# Patient Record
Sex: FEMALE | Race: WHITE
[De-identification: names, ages, dates, MRNs, and addresses within clinical notes are randomized per-mention and may not be internally consistent; named-entity substitution may affect disease eponyms.]

---

## 2017-04-28 ENCOUNTER — HOSPITAL ENCOUNTER (OUTPATIENT)
Dept: HOSPITAL 53 - M LAB REF | Age: 82
End: 2017-04-28
Attending: NURSE PRACTITIONER
Payer: MEDICARE

## 2017-04-28 DIAGNOSIS — D50.9: Primary | ICD-10-CM

## 2017-05-01 LAB
FERRITIN SERPL-MCNC: 389 NG/ML (ref 8–252)
FOLATE SERPL-MCNC: > 24 NG/ML
IRON SATN MFR SERPL: 46.2 % (ref 13.2–37.4)
TIBC SERPL-MCNC: 275 UG/DL (ref 250–450)
VIT B12 SERPL-MCNC: 1119 PG/ML

## 2017-08-04 ENCOUNTER — HOSPITAL ENCOUNTER (OUTPATIENT)
Dept: HOSPITAL 53 - M LRY | Age: 82
End: 2017-08-04
Attending: NURSE PRACTITIONER
Payer: MEDICARE

## 2017-08-04 DIAGNOSIS — M17.11: Primary | ICD-10-CM

## 2017-08-04 PROCEDURE — 73564 X-RAY EXAM KNEE 4 OR MORE: CPT

## 2017-08-04 NOTE — REP
RIGHT KNEE SERIES, COMPLETE:  08/04/2017:

 

Clinical history: Anterior right knee pain. The patient had difficulty

maintaining positioning for some of these images.

 

Findings:  No prior studies.  Bones are demineralized.  No subluxation of the

patella on the sunrise view.  I cannot see a definite suprapatellar effusion.

There are marginal osteophytes.  The medial and lateral joint margins and

patellofemoral articulation, representing some mild tricompartment

osteoarthritis.  No definite narrowing of the medial lateral compartments.  There

is no visible or displaced fracture, avulsion, loose body or osteochondral

defect.

 

Impression:

 

1.  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

avulsion, subluxation nor other acute finding.  No definite suprapatellar

effusion.

 

 

Signed by

Nitesh Teran MD 08/04/2017 10:27 P

## 2017-09-03 ENCOUNTER — HOSPITAL ENCOUNTER (EMERGENCY)
Dept: HOSPITAL 53 - M ED | Age: 82
Discharge: HOME | End: 2017-09-03
Payer: MEDICARE

## 2017-09-03 VITALS — SYSTOLIC BLOOD PRESSURE: 117 MMHG | DIASTOLIC BLOOD PRESSURE: 59 MMHG

## 2017-09-03 DIAGNOSIS — S00.91XA: Primary | ICD-10-CM
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
## 2017-09-03 DIAGNOSIS — Z79.899: ICD-10-CM
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
## 2017-09-03 DIAGNOSIS — I10: ICD-10-CM

## 2017-09-03 DIAGNOSIS — S70.02XA: ICD-10-CM
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
## 2017-09-03 NOTE — REP
Clinical:  Trauma.  Fall.

 

Technique:  AP and lateral views of the left femur.

 

Findings:

In conjunction with the left hip series, there is no evidence for acute fracture

or dislocation.  The patient is status post left hip replacement.  Age-related

changes noted.

 

Impression:

No acute fracture or dislocation.

 

 

Signed by

Alexys Weiss MD 09/03/2017 11:20 A

## 2017-09-03 NOTE — REP
Clinical:  Trauma.  Fall.  Head injury.

 

Findings:

Age-related atrophy and microvascular ischemic changes are appreciated.  The

ventricles and sulci are symmetric.  Gray-white differentiation is maintained.

There is no evidence for acute intracranial hemorrhage, mass/mass effect,

pathology or infarction.  No extra-axial fluid collection.  Calvarium is intact.

Paranasal sinuses and mastoid air cells are clear.

 

Small scalp hematoma/contusion overlies the left frontal bone.

 

Impression:

Small scalp contusion/hematoma overlies the left frontal bone.

Age related atrophy and microvascular ischemic changes.

No acute intracranial hemorrhage, infarction, or mass/mass effect.

 

 

Signed by

Alexys Weiss MD 09/03/2017 11:09 A

## 2017-09-03 NOTE — REP
Clinical:  Trauma.  Fall.

 

Technique:  Single AP view of the pelvis.

 

Findings:

The patient is status post left hip replacement.  Advanced degenerative changes

of the right hip.  No acute fracture dislocation identified.

 

Impression:

No acute fracture dislocation.

 

 

Signed by

Alexys Weiss MD 09/03/2017 11:21 A

## 2017-09-03 NOTE — REP
Clinical:  Trauma.  Fall.

 

Technique:  AP and frog lateral views of the left hip.

 

Findings:

The patient is status post replacement.  Age-related changes are appreciated.  No

acute fracture or dislocation.

 

Impression:

Evidence of prior left hip replacement.

No acute fracture or dislocation.

 

 

Signed by

Alexys Weiss MD 09/03/2017 11:19 A

## 2017-12-18 ENCOUNTER — HOSPITAL ENCOUNTER (OUTPATIENT)
Dept: HOSPITAL 53 - M WHC | Age: 82
End: 2017-12-18
Attending: INTERNAL MEDICINE
Payer: MEDICARE

## 2017-12-18 DIAGNOSIS — Z13.820: ICD-10-CM

## 2017-12-18 DIAGNOSIS — M81.0: ICD-10-CM

## 2017-12-18 DIAGNOSIS — Z12.31: Primary | ICD-10-CM

## 2017-12-18 PROCEDURE — 77080 DXA BONE DENSITY AXIAL: CPT

## 2017-12-18 NOTE — REPMRS
Patient History

The patient states she had a clinical breast exam in 10/17

Patient is postmenopausal and is nulliparous.

 

Digital Woman Screen Mammo: December 18, 2017 - Exam #: 

[id number]

Bilateral CC and MLO view(s) were taken.

 

Technologist: Pat Morgan, Technologist

Prior study comparison: November 2, 2016, digital woman screen 

mammo performed at [facility].  October 28, 2015, 

digital woman screen mammo performed at [facility].

 

FINDINGS: There are scattered fibroglandular densities.  There 

has been no change in the appearance of the mammogram from the 

prior studies.  There is a mild amount of residual fibroglandular

tissue which is fairly symmetric. There is no interval 

development of dominant mass, architectural distortion, or 

clustered microcalcification suggestive of malignancy.

 

ASSESSMENT: BI-RADS/ACR category 1 mammogram. Negative.

 

Recommendation

Routine screening mammogram in 1 year (for women over age 40).

This mammogram was interpreted with the aid of an FDA-approved 

computer-aided dectection system.

 

Electronically Signed By: Sravan French MD 12/18/17 [id number]

## 2017-12-19 NOTE — DEXA
AP SPINE   L1 - L4   1.019   -1.4      0.4

LT FEMUR   TOTAL   Left hip replacement         

RT FEMUR   TOTAL   0.896   -0.9      1.2

TOTAL BODY   TOTAL

OTHER



COMMENTS:

Normal bone densitometry of the right hip.

There is low bone density of the spine.

The decreased density of the spine does represent a significant change.

The increased density of the right hip does represent a significant change.

The density of the spine has increased 4.5% since the initial exam on 05/16/
2002.

The spine density has decreased 5.4% since the most recent exam on 10/28/2015.

The density of the right hip has increased 6.3% since the initial exam on 05/16/
2002.

The density of the right hip has increased 3.5% since the most recent exam on 10
/28/2015.



FOLLOW-UP:

Recommendation for the next bone density exam: 2 years.
[facility]

## 2018-12-19 ENCOUNTER — HOSPITAL ENCOUNTER (OUTPATIENT)
Dept: HOSPITAL 53 - M WHC | Age: 83
End: 2018-12-19
Attending: NURSE PRACTITIONER
Payer: MEDICARE

## 2018-12-19 DIAGNOSIS — Z78.0: ICD-10-CM

## 2018-12-19 DIAGNOSIS — Z12.31: Primary | ICD-10-CM

## 2018-12-19 NOTE — REPMRS
Patient History

The patient states she had a clinical breast exam in 11/18

Patient is postmenopausal, has history of other cancer at age 83,

and is nulliparous.

 

Digital Woman Screen Mammo: December 19, 2018 - Exam #: 

[id number]

Bilateral CC and MLO view(s) were taken.

 

Technologist: Pat Morgan, Technologist

Prior study comparison: December 18, 2017, digital woman screen 

mammo performed at [facility].  November 2, 2016, 

digital woman screen mammo performed at [facility].

October 28, 2015, digital woman screen mammo performed at 

[facility].

 

FINDINGS: There are scattered fibroglandular densities.  There 

has been no change in the appearance of the mammogram from the 

prior studies.  There is a mild amount of scattered 

fibroglandular density which is fairly symmetric. There is no 

interval development of dominant mass, architectural distortion, 

or clustered microcalcification suggestive of malignancy.

3-D tomosynthesis shows no additional findings.

 

Assessment: BI-RADS/ACR category 1 mammogram. Negative.

 

Recommendation

Routine screening mammogram of both breasts in 1 year (for women 

over age 40).

 

This patient's Lifetime Breast Cancer RIsk is estimated at 1.3 %.

This mammogram was interpreted with the aid of an FDA-approved 

computer-aided dectection system.

 

Electronically Signed By: Fausto Antonio MD 12/19/18 1002

## 2020-01-28 ENCOUNTER — HOSPITAL ENCOUNTER (OUTPATIENT)
Dept: HOSPITAL 53 - M WHC | Age: 85
End: 2020-01-28
Attending: NURSE PRACTITIONER
Payer: MEDICARE

## 2020-01-28 DIAGNOSIS — M81.0: ICD-10-CM

## 2020-01-28 DIAGNOSIS — Z12.31: Primary | ICD-10-CM

## 2020-01-28 NOTE — REP
BILATERAL SCREENING DIGITAL MAMMOGRAM WITH 3D TOMOSYNTHESIS:

 

There are no palpable abnormalities or other breast complaints.

The the patient states she had a clinical breast examination December, 2019.

The the patient states she performs self-breast examinations 12 times per year.

The Tyrer-Cuzick Score is: 0.9% .

 

Comparison is 11/10/2014.

 

There are scattered areas of fibroglandular density.

There is no dominant mass, micro calcific cluster or architectural distortion

that would indicate malignancy.

There are no additional findings on 3D tomosynthesiss.

There is no change from the prior study.

 

Impression:

BIRADS/ACR category 1 mammogram.  Negative.

 

Recommendation:

Routine annual screening mammography.

 

This mammogram was interpreted with the aid of a FDA approved computer-aided

detection system.

 

A. Negative mammogram reports should not delay biopsy if a dominant or clinically

suspicious mass is present.

B. Not all breast cancers are identified by mammography or tomosynthesis.

C.  Adenosis and dense breasts may obscure an underlying neoplasm.

 

Patient letter M1.

 

 

Electronically Signed by

Sravan Mathew MD 01/28/2020 04:22 P

## 2021-01-08 ENCOUNTER — HOSPITAL ENCOUNTER (OUTPATIENT)
Dept: HOSPITAL 53 - M WHC | Age: 86
End: 2021-01-08
Attending: NURSE PRACTITIONER
Payer: MEDICARE

## 2021-01-08 DIAGNOSIS — Z12.31: Primary | ICD-10-CM

## 2021-01-08 NOTE — REPMRS
Patient History

The patient states she had a clinical breast exam in 12/2020

No Hormone Replacement Therapy

 

3D TOMOSYNTHESIS WAS PERFORMED.

 

The [facility] lifetime risk for breast cancer is 0.4%.

 

Volpara breast density c.

 

Digital Woman Screen Mammo: January 8, 2021 - Exam #: 

[id number]

Bilateral CC and MLO view(s) were taken.

 

Technologist: Pat Morgan, Technologist

Prior study comparison: January 28, 2020, bilateral digital woman

screen mammo performed at [facility] 

[facility].  December 19, 2018, bilateral digital woman screen 

mammo performed at [facility].

 

FINDINGS: There are scattered fibroglandular densities.  There 

has been no change in the appearance of the mammogram from the 

prior studies.  There is a mild amount of residual fibroglandular

tissue which is fairly symmetric. There is no interval 

development of dominant mass, architectural distortion, or 

clustered microcalcification suggestive of malignancy.

 

Assessment: BI-RADS/ACR category 1 mammogram. Negative Mammogram.

 

Recommendation

Routine screening mammogram in 1 year (for women over age 40).

This mammogram was interpreted with the aid of an FDA-approved 

computer-aided dectection system.

 

Electronically Signed By: Sravan French MD 01/08/21 1966

## 2022-02-09 ENCOUNTER — HOSPITAL ENCOUNTER (OUTPATIENT)
Dept: HOSPITAL 53 - M WHC | Age: 87
End: 2022-02-09
Attending: INTERNAL MEDICINE
Payer: MEDICARE

## 2022-02-09 DIAGNOSIS — Z12.31: Primary | ICD-10-CM

## 2022-05-13 ENCOUNTER — HOSPITAL ENCOUNTER (OUTPATIENT)
Dept: HOSPITAL 53 - M RAD | Age: 87
End: 2022-05-13
Attending: INTERNAL MEDICINE
Payer: MEDICARE

## 2022-05-13 DIAGNOSIS — R16.1: Primary | ICD-10-CM

## 2023-04-19 ENCOUNTER — HOSPITAL ENCOUNTER (OUTPATIENT)
Dept: HOSPITAL 53 - M RAD | Age: 88
End: 2023-04-19
Attending: INTERNAL MEDICINE
Payer: MEDICARE

## 2023-04-19 DIAGNOSIS — R10.811: ICD-10-CM

## 2023-04-19 DIAGNOSIS — R19.5: Primary | ICD-10-CM

## 2023-04-19 PROCEDURE — 74178 CT ABD&PLV WO CNTR FLWD CNTR: CPT
